# Patient Record
Sex: MALE | Race: WHITE | NOT HISPANIC OR LATINO | Employment: FULL TIME | URBAN - METROPOLITAN AREA
[De-identification: names, ages, dates, MRNs, and addresses within clinical notes are randomized per-mention and may not be internally consistent; named-entity substitution may affect disease eponyms.]

---

## 2024-07-19 ENCOUNTER — OFFICE VISIT (OUTPATIENT)
Dept: FAMILY MEDICINE CLINIC | Facility: CLINIC | Age: 39
End: 2024-07-19

## 2024-07-19 VITALS
HEIGHT: 69 IN | OXYGEN SATURATION: 97 % | SYSTOLIC BLOOD PRESSURE: 138 MMHG | RESPIRATION RATE: 16 BRPM | HEART RATE: 66 BPM | WEIGHT: 276.4 LBS | TEMPERATURE: 97.9 F | BODY MASS INDEX: 40.94 KG/M2 | DIASTOLIC BLOOD PRESSURE: 94 MMHG

## 2024-07-19 DIAGNOSIS — M79.89 LEFT LEG SWELLING: Primary | ICD-10-CM

## 2024-07-19 PROCEDURE — 99213 OFFICE O/P EST LOW 20 MIN: CPT | Performed by: FAMILY MEDICINE

## 2024-07-19 RX ORDER — FUROSEMIDE 20 MG/1
10 TABLET ORAL DAILY
Qty: 3 TABLET | Refills: 0 | Status: SHIPPED | OUTPATIENT
Start: 2024-07-19 | End: 2024-07-24
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
## 2025-01-09 ENCOUNTER — HOSPITAL ENCOUNTER (OUTPATIENT)
Dept: RADIOLOGY | Facility: HOSPITAL | Age: 40
Discharge: HOME | End: 2025-01-09
Attending: FAMILY MEDICINE
Payer: COMMERCIAL

## 2025-01-09 ENCOUNTER — RESULTS FOLLOW-UP (OUTPATIENT)
Dept: FAMILY MEDICINE CLINIC | Facility: CLINIC | Age: 40
End: 2025-01-09

## 2025-01-09 DIAGNOSIS — M79.89 LEFT LEG SWELLING: Primary | ICD-10-CM

## 2025-01-09 DIAGNOSIS — M79.89 LEFT LEG SWELLING: ICD-10-CM

## 2025-01-09 PROCEDURE — 93971 EXTREMITY STUDY: CPT

## 2025-01-09 PROCEDURE — 93971 EXTREMITY STUDY: CPT | Performed by: SURGERY
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
## 2025-03-07 ENCOUNTER — OFFICE VISIT (OUTPATIENT)
Dept: VASCULAR SURGERY | Facility: CLINIC | Age: 40
End: 2025-03-07
Payer: COMMERCIAL

## 2025-03-07 VITALS
WEIGHT: 283 LBS | HEIGHT: 69 IN | HEART RATE: 72 BPM | BODY MASS INDEX: 41.92 KG/M2 | DIASTOLIC BLOOD PRESSURE: 96 MMHG | SYSTOLIC BLOOD PRESSURE: 126 MMHG

## 2025-03-07 DIAGNOSIS — M79.89 LEFT LEG SWELLING: ICD-10-CM

## 2025-03-07 DIAGNOSIS — I87.2 VENOUS INSUFFICIENCY: Primary | ICD-10-CM

## 2025-03-07 PROBLEM — E66.01 CLASS 3 SEVERE OBESITY DUE TO EXCESS CALORIES WITH BODY MASS INDEX (BMI) OF 40.0 TO 44.9 IN ADULT (HCC): Status: ACTIVE | Noted: 2025-03-07

## 2025-03-07 PROBLEM — E66.813 CLASS 3 SEVERE OBESITY DUE TO EXCESS CALORIES WITH BODY MASS INDEX (BMI) OF 40.0 TO 44.9 IN ADULT (HCC): Status: ACTIVE | Noted: 2025-03-07

## 2025-03-07 PROCEDURE — 99204 OFFICE O/P NEW MOD 45 MIN: CPT | Performed by: PHYSICIAN ASSISTANT

## 2025-03-07 NOTE — ASSESSMENT & PLAN NOTE
40 yo well male with obesity (BMI 41) who is referred for LLE edema. Patient gives history of bilateral lower extremity edema for the last 6 to 12 months, worse on the left.  Also, on the left, he has pedal edema.  He likely had some venous insufficiency on top of secondary lymphedema after having had trauma to both legs.  He wears OTC compression.  Comes in for evaluation.  Would like to hold off on surgery if possible.    -B L>R LE edema; recent L pedal edema  -No wounds, drainage or history of infection/ cellulitis  -Tired, heavy or aching legs. No DVT.     -LEV, left 1/9/2025: Left leg with deep venous insufficiency with 2.3 second reflux in the popliteal and superficial venous insufficiency in the mid calf GSV for 4.7 seconds. No DVT or SVT. Triphasic.     Plan:  -Bilateral LE edema with underlying venous insufficiency and obesity  -Evidence of deep and superficial incompetence for which compression, conservative measures and continued monitoring is recommended  -Some chronic skin changes medial malleoli likely related to trauma and not worsening  -Discussed pathophysiology and treatment of venous insufficiency  -Script for medical compression stockings  -Continue with compression, elevation, activity as tolerated and skin moisturizer.   -Elevate your legs when at rest  -Regular exercise for weight loss will help your lymphedema and general health  -Good heart healthy diet with low sodium  -Apply good moisturizer to skin-Eucerin, Cetaphil, CeraVe, etc.  -Baseline measurements in the office today  -Recommend continued monitoring and can consider additional therapies, if worsening symptoms or skin changes  -Follow up as needed  Orders:    Compression Stocking

## 2025-03-07 NOTE — PROGRESS NOTES
"[facility]   Lauro Willis   Phone: [phone]  Fax: [phone]   E-mail: [email or web address]    Ordering Provider:  Daphne Bethea (NPI: [id number])  [facility]  [address] [place] [address]   [address]  [place] [address]  Phone: [phone]  Fax: [phone]      Patient Information  MRN: [id number]   Mark Spatz  1985  [address] [facility] [id number] [phone]     Insurance Information  Payor: BLUE CROSS / Plan: [employer] PLAN 280 / Product Type: Blue Fee for Service /      [id number] - ()     Patient Height and Weight 5' 9\" (1.753 m)    Wt Readings from Last 1 Encounters:   03/07/25 128 kg (283 lb)       Compression Lymphedema Pump Prescription Form      [x] Patient utilized Compression Garment >= 30 mmHg distally OR Gradient Wrap System    Appliance:     Legs:    [x] Right    [x] Left   Arms:    [] Right    [] Left     []Chest garment    []Abdominal garment     Length of need: 99 months    Protocol:  [x] Std: 40 mmHg, TID/ BID,  60 minutes  [] Other:   Pressures: __ mmHg    Frequency: __ / Day   Minutes/ Session: __ minutes    Patient History and Prognosis:  [x] Patient was diagnosed for the chronic disorder with reported on-set __  [x] Attempts at elevation and compression have not improved patients' condition and is now at risk of lymphatic disorder progressing to the next stage/ grade  [x] Patient's physical condition/ range of motion limited for exercise  [x] Patient/ Caregiver experienced difficulty applying 30 mmHg distal compression garments  [x] Patient compression stocking/ wrap tolerance limited due to pain/ reduced circulation  [x] Patient advised to reduce salt intake and adhere to a daily regiment of elevation, compression, and lymphatic exercises  [x] Patient's severe condition will not improve without further treatment interventions  [x] Patient has noted skin changes such as marked hyperkeratosis with " hyperplasia and hyperpigmentation, papillomatosis cutis lymphostatica, elephantiasis, and/ or skin breakdown with persisting lymphorrhea    Symptoms/ Observation/ Evaluation/ Plan of Care for Lymphedema / Venous Compression Pumps     Conservative Care >= 4 weeks for severe lymphedema (check all that apply):  Patient instructions for DAILY use of conservative therapies;  [x] Elevate extremities daily and nightly to reduce swelling  [x] Exercise and ambulate / range of motion (ROM) daily to increase fluid flow and reduce swelling  [x] Wear 30-mmHg distal compression garments / wraps daily to reduce / control swelling  [x] Manual lymph drainage (MLD)  [x] On-set date of lymphedema / ulcers: 3/2024      Initial Measurements      Body Part Right Left   Ankle / Forearm 26 cm 26.5 cm   Calf / Elbow  50 cm 52 cm   Knee / Bicep  Not Applicable (N/A) Not Applicable (N/A)   Mid-Thigh / Axilla  71 cm 71 cm

## 2025-03-07 NOTE — PATIENT INSTRUCTIONS
Lymphedema    -Recommend medical compression garment to be worn daily and removed at night  -Continue with compression, elevation, activity as tolerated and skin moisturizer.   -Elevate your legs when at rest  -Regular exercise for weight loss will help your lymphedema and general health  -Good heart healthy diet with low sodium  -Apply good moisturizer to skin-Eucerin, Cetaphil, CeraVe, etc.  -Consider lymphedema pumps in the future if condition worsens  -Consider physiotherapy with manual lymphatic drainage for therapeutic purposes  -Follow up as needed